# Patient Record
Sex: FEMALE | URBAN - METROPOLITAN AREA
[De-identification: names, ages, dates, MRNs, and addresses within clinical notes are randomized per-mention and may not be internally consistent; named-entity substitution may affect disease eponyms.]

---

## 2022-06-16 ENCOUNTER — EMERGENCY (EMERGENCY)
Facility: HOSPITAL | Age: 27
LOS: 1 days | Discharge: LEFT BEFORE DISCHARGE | End: 2022-06-16
Admitting: EMERGENCY MEDICINE
Payer: SELF-PAY

## 2022-06-16 VITALS
OXYGEN SATURATION: 98 % | DIASTOLIC BLOOD PRESSURE: 60 MMHG | RESPIRATION RATE: 16 BRPM | SYSTOLIC BLOOD PRESSURE: 114 MMHG | HEART RATE: 77 BPM | TEMPERATURE: 98 F

## 2022-06-16 PROCEDURE — L9991: CPT

## 2022-06-16 NOTE — ED ADULT TRIAGE NOTE - CHIEF COMPLAINT QUOTE
~ 6 months pregnant, ALLA 9/6-- was cooking spicy food and feels a stinging to L hand, no external symptoms, no OB complaints  as per L&D, pt to be seen in adult ED

## 2022-06-16 NOTE — ED ADULT TRIAGE NOTE - ED_CALLED_NO_RESPONSE_NOTE 3
unable to find patient as per EDT in intake, called 3 times with no answer after was brought to intake chairs